# Patient Record
Sex: MALE | URBAN - METROPOLITAN AREA
[De-identification: names, ages, dates, MRNs, and addresses within clinical notes are randomized per-mention and may not be internally consistent; named-entity substitution may affect disease eponyms.]

---

## 2017-08-12 ENCOUNTER — TELEPHONE (OUTPATIENT)
Dept: NURSING | Facility: CLINIC | Age: 52
End: 2017-08-12

## 2017-08-12 NOTE — TELEPHONE ENCOUNTER
Aleksandr has  Had a severe tooth ache today, and feels his face is swelling now. No fever .  He has a dentist, but clinic  not open .  He willconsider coming to ED for possible antibiotic  If there is an abcess. He is aware that  He will need to see a dentist to get the tooth pulled .